# Patient Record
(demographics unavailable — no encounter records)

---

## 2025-02-13 NOTE — HISTORY OF PRESENT ILLNESS
[FreeTextEntry1] : Patient is a yo 69 F who presents today for initial evaluation of B/l breast pain and abnormal breast imaging.  Patient states she has had B/L intermittent breast pain for majority of her life however she had never had it evaluated before.  She endorsed her concerns to her daughter's mother-in-law (who is a patient of mine) and advised her to see me as she has not had breast imaging since 2020 and was endorsing pain. She has hx of benign liver lesions. Denies family history of breast or ovarian cancer. Patient denies palpable masses, skin changes, or nipple discharge bilaterally.  Of note, patient did not have breast imaging from [id number]. Of note, patient follows with urology gynecologist, Dr. Marta Orellana at [facility] for complications after hysterectomy requiring multiple bladder repairs, last done in 2022.   (2/13/25) SUNIL Lifetime Risk- 4.3%  3/20/2015: R DX US ([facility]): R 0.6cm 9:00 1 FN FNA-negative for malignant cells.  Fat necrosis. 3/10/2016: B/L MG ([facility]): ALISON.  BI-RADS 1 12/13/2018: B/L MG ([facility]): ALISON.  BI-RADS 1 12/23/2020: B/L MG ([facility]): ALISON.  BI-RADS 1 2/13/2025: B/l MG/US ([facility]): Scattered fibroglandular.  R upper questioned asymmetry does not persist on additional views with no US finding in this area.  US-R 0.4 cm 9:00 to FN lobulated mass (rec 6-month follow-up R DX US).  No lymphadenopathy.  L ALISON.  BI-RADS 3

## 2025-02-13 NOTE — PHYSICAL EXAM
[Supple] : supple [No Supraclavicular Adenopathy] : no supraclavicular adenopathy [No Cervical Adenopathy] : no cervical adenopathy [No Axillary Lymphadenopathy] : no left axillary lymphadenopathy [de-identified] : Bilateral vs R vs L breast/axilla/supraclavicular area: No masses, discharge, or adenopathy

## 2025-02-13 NOTE — PLAN
[TextEntry] : Reviewed imaging findings and discussed results with patient.  Reviewed with patient she has to get 6-month follow-up R DX US to ensure stability of right breast mass.  If stable, she is to return in 1 year for annual B/L MG/US with office visit and reexamination in benign disease clinic with Marni Downs NP.  Reviewed importance of timely breast imaging and exams. Discussed etiologies of breast pain including hormonal changes, benign entities such as cysts, reactive lymph nodes, musculoskeletal issues and rarely malignancy. Recommended wearing a supportive sports bra regularly, the alternating of warm/cold packs, as well as stretching.

## 2025-02-13 NOTE — PAST MEDICAL HISTORY
[Postmenopausal] : The patient is postmenopausal [Menopause Age____] : age at menopause was [unfilled] [History of Hormone Replacement Treatment] : has a history of hormone replacement treatment [Normal Duration] : the duration was normal [Irregular Cycle Intervals] : are  irregular [Total Preg ___] : G[unfilled] [Live Births ___] : P[unfilled]  [Full Term ___] : Full Term: [unfilled] [Abortions ___] : Abortions:[unfilled] [Age At Live Birth ___] : Age at live birth: [unfilled] [FreeTextEntry5] : Hysterectomy  [FreeTextEntry2] : Pt had 2 miscarriages  [FreeTextEntry6] : No  [FreeTextEntry7] : No  [FreeTextEntry8] : Yes [TextEntry] : Patient was on cenestine for 9 years when she had a hysterectomy